# Patient Record
Sex: FEMALE | Race: WHITE | NOT HISPANIC OR LATINO | Employment: FULL TIME | URBAN - METROPOLITAN AREA
[De-identification: names, ages, dates, MRNs, and addresses within clinical notes are randomized per-mention and may not be internally consistent; named-entity substitution may affect disease eponyms.]

---

## 2019-08-06 ENCOUNTER — OFFICE VISIT - HEALTHEAST (OUTPATIENT)
Dept: FAMILY MEDICINE | Facility: CLINIC | Age: 34
End: 2019-08-06

## 2019-08-06 DIAGNOSIS — F33.2 SEVERE EPISODE OF RECURRENT MAJOR DEPRESSIVE DISORDER, WITHOUT PSYCHOTIC FEATURES (H): ICD-10-CM

## 2019-08-06 DIAGNOSIS — N30.01 ACUTE CYSTITIS WITH HEMATURIA: ICD-10-CM

## 2019-08-06 DIAGNOSIS — F41.9 ANXIETY: ICD-10-CM

## 2019-08-06 ASSESSMENT — MIFFLIN-ST. JEOR: SCORE: 1520.96

## 2021-05-31 NOTE — PROGRESS NOTES
Assessment/Plan:      Patient was incorrectly assigned a 20-minute emergency department follow-up/establishment of care appointment type and then arrived late.  Given the limitations of time, we chose to the following:    Mood:  -Patient has been on 2 separate SSRIs, one was insufficient and one was too expensive to determine efficacy; patient has been on Wellbutrin in the past but found that it worsened her anxiety  -However, given patient's extreme symptoms and need for treatment urgently, we felt that a trial of Wellbutrin may be reasonable.  If patient does not tolerate it, would initiate patient on Prozac.  If patient does tolerate it but would like improved efficacy, could combine Wellbutrin and Prozac.  -Discussed that I typically do not prescribe benzodiazepines but that if patient needed a bridge prior to seeing psychiatry, I would be happy to fill this.  -Offered hydroxyzine but patient has taken it in the past and patient does not feel that she would use it  -Referral placed to psychiatry in case treatment regimen is ineffective as a precaution    She had evidence of a urinary tract infection in the emergency department last night and has been expensing symptoms for several weeks:  -Await culture  -3 days Macrobid  -Low threshold to obtain a wet prep at next appointment      Problem List Items Addressed This Visit     None      Visit Diagnoses     Acute cystitis with hematuria    -  Primary    Relevant Medications    nitrofurantoin, macrocrystal-monohydrate, (MACROBID) 100 MG capsule    Anxiety        Relevant Medications    buPROPion (WELLBUTRIN XL) 150 MG 24 hr tablet    Other Relevant Orders    Ambulatory referral to Psychiatry    Severe episode of recurrent major depressive disorder, without psychotic features (H)        Relevant Medications    buPROPion (WELLBUTRIN XL) 150 MG 24 hr tablet    Other Relevant Orders    Ambulatory referral to Psychiatry          Return to clinic in 1-2 weeks.    Total  "time spent with patient was 25 minutes with greater than 50% spent in face-to-face counseling regarding the above plan.    Subjective:      Radhika Negrete is a 33 y.o. female who presents to clinic for emergency department follow-up.  Patient was seen yesterday in the emergency department for depression.  It worsened when she discontinued her antidepressant, Zoloft.  Apparently her PCP changed her to Trintellix, another SSRI, but patient had not ever initiated it secondary to cost.  In addition, patient had been taking Adderall without a prescription and recently quit.  This information is from the emergency department provider note.    Patient discusses her gambling addiction.  She has a history of depression and has been on Abilify but has no diagnosis of bipolar disorder.  She denies thoughts of grandiosity, risky behavior, sexual promiscuity, drug use, or shopping sprees.  She has tried Zoloft and Trintellix in the past.  She felt the Zoloft was ineffective.   Patient has also tried hydroxyzine and has been on Xanax in the past.  She is currently not taking any of these medications.    She specifically declines any thoughts of hurting herself but does state \"what is the point\".  When asked specifically about passive suicidal ideation patient did endorse this feature.  She has no plan.  She feels safe.      Past Medical History, Family History, and Social History reviewed.    Current Outpatient Medications on File Prior to Visit   Medication Sig Dispense Refill     ALPRAZolam (XANAX) 0.5 MG tablet Take 0.5 mg by mouth bedtime as needed for sleep.       citalopram (CELEXA) 40 MG tablet Take 40 mg by mouth daily.       diclofenac (VOLTAREN) 75 MG EC tablet Take 75 mg by mouth daily as needed.       zolpidem (AMBIEN) 5 MG tablet Take 5 mg by mouth bedtime as needed for sleep.       No current facility-administered medications on file prior to visit.        Review of systems is as stated in HPI.  The remainder of " "the 10 system review is otherwise negative.    Objective:     BP 94/68   Pulse 91   Ht 5' 3\" (1.6 m)   Wt 190 lb (86.2 kg)   LMP 08/05/2019   SpO2 98%   BMI 33.66 kg/m   Body mass index is 33.66 kg/m .    Gen: Alert, NAD, appears stated age, normal hygiene   Psych: no apparent hallucinations or delusions, no pressured speech; alert, oriented x3, tearful but appropriate    This note has been dictated using voice recognition software. Any grammatical or context distortions are unintentional and inherent to the the software.     Paty Oneill MD      "

## 2021-06-01 ENCOUNTER — RECORDS - HEALTHEAST (OUTPATIENT)
Dept: ADMINISTRATIVE | Facility: CLINIC | Age: 36
End: 2021-06-01

## 2021-06-03 VITALS — BODY MASS INDEX: 33.66 KG/M2 | WEIGHT: 190 LBS | HEIGHT: 63 IN

## 2025-01-14 ENCOUNTER — OFFICE VISIT (OUTPATIENT)
Dept: FAMILY MEDICINE | Facility: CLINIC | Age: 40
End: 2025-01-14
Payer: COMMERCIAL

## 2025-01-14 VITALS
BODY MASS INDEX: 41.3 KG/M2 | HEART RATE: 73 BPM | SYSTOLIC BLOOD PRESSURE: 138 MMHG | RESPIRATION RATE: 14 BRPM | DIASTOLIC BLOOD PRESSURE: 82 MMHG | TEMPERATURE: 97.7 F | OXYGEN SATURATION: 96 % | HEIGHT: 64 IN | WEIGHT: 241.9 LBS

## 2025-01-14 DIAGNOSIS — Z98.84 BARIATRIC SURGERY STATUS: ICD-10-CM

## 2025-01-14 DIAGNOSIS — Z13.220 LIPID SCREENING: ICD-10-CM

## 2025-01-14 DIAGNOSIS — Z12.31 VISIT FOR SCREENING MAMMOGRAM: ICD-10-CM

## 2025-01-14 DIAGNOSIS — E66.01 MORBID OBESITY (H): ICD-10-CM

## 2025-01-14 DIAGNOSIS — E34.9 HORMONE IMBALANCE: ICD-10-CM

## 2025-01-14 DIAGNOSIS — R63.5 WEIGHT GAIN: ICD-10-CM

## 2025-01-14 DIAGNOSIS — R53.83 FATIGUE, UNSPECIFIED TYPE: Primary | ICD-10-CM

## 2025-01-14 PROBLEM — F41.1 ANXIETY STATE: Status: ACTIVE | Noted: 2025-01-14

## 2025-01-14 LAB
ALBUMIN SERPL BCG-MCNC: 4.3 G/DL (ref 3.5–5.2)
ALP SERPL-CCNC: 70 U/L (ref 40–150)
ALT SERPL W P-5'-P-CCNC: 21 U/L (ref 0–50)
ANION GAP SERPL CALCULATED.3IONS-SCNC: 12 MMOL/L (ref 7–15)
AST SERPL W P-5'-P-CCNC: 20 U/L (ref 0–45)
BILIRUB SERPL-MCNC: 0.3 MG/DL
BUN SERPL-MCNC: 14.6 MG/DL (ref 6–20)
CALCIUM SERPL-MCNC: 9.4 MG/DL (ref 8.8–10.4)
CHLORIDE SERPL-SCNC: 105 MMOL/L (ref 98–107)
CHOLEST SERPL-MCNC: 198 MG/DL
CREAT SERPL-MCNC: 0.88 MG/DL (ref 0.51–0.95)
EGFRCR SERPLBLD CKD-EPI 2021: 85 ML/MIN/1.73M2
ERYTHROCYTE [DISTWIDTH] IN BLOOD BY AUTOMATED COUNT: 12.9 % (ref 10–15)
FASTING STATUS PATIENT QL REPORTED: YES
FASTING STATUS PATIENT QL REPORTED: YES
FERRITIN SERPL-MCNC: 16 NG/ML (ref 6–175)
FSH SERPL IRP2-ACNC: 4.6 MIU/ML
GLUCOSE SERPL-MCNC: 90 MG/DL (ref 70–99)
HCO3 SERPL-SCNC: 23 MMOL/L (ref 22–29)
HCT VFR BLD AUTO: 39.7 % (ref 35–47)
HDLC SERPL-MCNC: 59 MG/DL
HGB BLD-MCNC: 13.3 G/DL (ref 11.7–15.7)
LDLC SERPL CALC-MCNC: 124 MG/DL
MCH RBC QN AUTO: 30.1 PG (ref 26.5–33)
MCHC RBC AUTO-ENTMCNC: 33.5 G/DL (ref 31.5–36.5)
MCV RBC AUTO: 90 FL (ref 78–100)
NONHDLC SERPL-MCNC: 139 MG/DL
PLATELET # BLD AUTO: 288 10E3/UL (ref 150–450)
POTASSIUM SERPL-SCNC: 3.9 MMOL/L (ref 3.4–5.3)
PROT SERPL-MCNC: 6.8 G/DL (ref 6.4–8.3)
RBC # BLD AUTO: 4.42 10E6/UL (ref 3.8–5.2)
SODIUM SERPL-SCNC: 140 MMOL/L (ref 135–145)
TRIGL SERPL-MCNC: 76 MG/DL
TSH SERPL DL<=0.005 MIU/L-ACNC: 1.86 UIU/ML (ref 0.3–4.2)
VIT B12 SERPL-MCNC: 567 PG/ML (ref 232–1245)
VIT D+METAB SERPL-MCNC: 21 NG/ML (ref 20–50)
WBC # BLD AUTO: 6.8 10E3/UL (ref 4–11)

## 2025-01-14 PROCEDURE — 85027 COMPLETE CBC AUTOMATED: CPT | Performed by: NURSE PRACTITIONER

## 2025-01-14 PROCEDURE — 82607 VITAMIN B-12: CPT | Performed by: NURSE PRACTITIONER

## 2025-01-14 PROCEDURE — 99000 SPECIMEN HANDLING OFFICE-LAB: CPT | Performed by: NURSE PRACTITIONER

## 2025-01-14 PROCEDURE — 82728 ASSAY OF FERRITIN: CPT | Performed by: NURSE PRACTITIONER

## 2025-01-14 PROCEDURE — 80053 COMPREHEN METABOLIC PANEL: CPT | Performed by: NURSE PRACTITIONER

## 2025-01-14 PROCEDURE — 84425 ASSAY OF VITAMIN B-1: CPT | Mod: 90 | Performed by: NURSE PRACTITIONER

## 2025-01-14 PROCEDURE — 83001 ASSAY OF GONADOTROPIN (FSH): CPT | Performed by: NURSE PRACTITIONER

## 2025-01-14 PROCEDURE — 80061 LIPID PANEL: CPT | Performed by: NURSE PRACTITIONER

## 2025-01-14 PROCEDURE — 36415 COLL VENOUS BLD VENIPUNCTURE: CPT | Performed by: NURSE PRACTITIONER

## 2025-01-14 PROCEDURE — 84443 ASSAY THYROID STIM HORMONE: CPT | Performed by: NURSE PRACTITIONER

## 2025-01-14 PROCEDURE — 82306 VITAMIN D 25 HYDROXY: CPT | Performed by: NURSE PRACTITIONER

## 2025-01-14 PROCEDURE — 99204 OFFICE O/P NEW MOD 45 MIN: CPT | Performed by: NURSE PRACTITIONER

## 2025-01-14 RX ORDER — CYANOCOBALAMIN 1000 UG/ML
1000 INJECTION, SOLUTION INTRAMUSCULAR; SUBCUTANEOUS
Qty: 3 ML | Refills: 3 | Status: SHIPPED | OUTPATIENT
Start: 2025-01-14

## 2025-01-14 ASSESSMENT — PAIN SCALES - GENERAL: PAINLEVEL_OUTOF10: NO PAIN (0)

## 2025-01-14 NOTE — PROGRESS NOTES
"  Assessment & Plan     Fatigue, unspecified type  - CBC with platelets  - CBC with platelets    Morbid obesity (H)  Instructed on use of medication, potential side effects and adverse reactions.    - Comprehensive metabolic panel (BMP + Alb, Alk Phos, ALT, AST, Total. Bili, TP)  - tirzepatide-Weight Management (ZEPBOUND) 2.5 MG/0.5ML prefilled pen  Dispense: 2 mL; Refill: 0  - tirzepatide-Weight Management (ZEPBOUND) 5 MG/0.5ML prefilled pen  Dispense: 2 mL; Refill: 0  - Comprehensive metabolic panel (BMP + Alb, Alk Phos, ALT, AST, Total. Bili, TP)    Hormone imbalance  - Follicle stimulating hormone  - Comprehensive metabolic panel (BMP + Alb, Alk Phos, ALT, AST, Total. Bili, TP)  - Follicle stimulating hormone  - Comprehensive metabolic panel (BMP + Alb, Alk Phos, ALT, AST, Total. Bili, TP)    Weight gain  - TSH with free T4 reflex  - Comprehensive metabolic panel (BMP + Alb, Alk Phos, ALT, AST, Total. Bili, TP)  - tirzepatide-Weight Management (ZEPBOUND) 2.5 MG/0.5ML prefilled pen  Dispense: 2 mL; Refill: 0  - tirzepatide-Weight Management (ZEPBOUND) 5 MG/0.5ML prefilled pen  Dispense: 2 mL; Refill: 0  - TSH with free T4 reflex  - Comprehensive metabolic panel (BMP + Alb, Alk Phos, ALT, AST, Total. Bili, TP)    Bariatric surgery status  - Vitamin B12  - Vitamin D Deficiency  - cyanocobalamin (CYANOCOBALAMIN) 1000 mcg/mL injection  Dispense: 3 mL; Refill: 3  - syringe/needle, disp, (SAFETY SYRINGE/NEEDLE) 23G X 1\" 3 ML MISC  Dispense: 50 each; Refill: 1  - Ferritin  - Vitamin B1 whole blood  - Vitamin B12  - Vitamin D Deficiency  - Ferritin  - Vitamin B1 whole blood    Lipid screening  - Lipid panel reflex to direct LDL Fasting  - Lipid panel reflex to direct LDL Fasting    Visit for screening mammogram  - MA Screen Bilateral w/Sonido            BMI  Estimated body mass index is 42.18 kg/m  as calculated from the following:    Height as of this encounter: 1.613 m (5' 3.5\").    Weight as of this encounter: 109.7 kg " "(241 lb 14.4 oz).   Weight management plan: Discussed healthy diet and exercise guidelines      Elio Garrido is a 39 year old, presenting for the following health issues:  Establish Care (Hormones, fatigue, weight gain, facial hair that she has not had before, gastric sleeve patient, has issues with craving foods)        1/14/2025    11:14 AM   Additional Questions   Roomed by MIGUEL SMITH     History of Present Illness       Reason for visit:  Find a primary pcp, weight loss help, horomone regultion  Symptom onset:  More than a month  Symptoms include:  Fatigue, facial hair growth, weight gain  Symptom intensity:  Moderate  Symptom progression:  Worsening  Had these symptoms before:  No  Prior treatment description:  Gastric Sleve    She eats 2-3 servings of fruits and vegetables daily.She consumes 0 sweetened beverage(s) daily.She exercises with enough effort to increase her heart rate 9 or less minutes per day.  She exercises with enough effort to increase her heart rate 3 or less days per week.   She is taking medications regularly.     For a while now has been feeling like her hormones are off- fatigue, weight gain, excessive facial hair growth, hair thinning, menses heavier.  Symptoms gradually worsening.  Menses still monthly but flow has changed.  Emotionally labile week before menses.     Review of Systems  Constitutional, HEENT, cardiovascular, pulmonary, GI, , musculoskeletal, neuro, skin, endocrine and psych systems are negative, except as otherwise noted.      Objective    /82 (BP Location: Left arm, Patient Position: Sitting, Cuff Size: Adult Regular)   Pulse 73   Temp 97.7  F (36.5  C) (Oral)   Resp 14   Ht 1.613 m (5' 3.5\")   Wt 109.7 kg (241 lb 14.4 oz)   LMP 12/20/2024 (Approximate)   SpO2 96%   Breastfeeding No   BMI 42.18 kg/m    Body mass index is 42.18 kg/m .  Physical Exam   GENERAL: alert and no distress  NECK: no adenopathy, no asymmetry, masses, or scars  RESP: lungs " clear to auscultation - no rales, rhonchi or wheezes  CV: regular rate and rhythm, normal S1 S2, no S3 or S4, no murmur, click or rub, no peripheral edema   MS: no gross musculoskeletal defects noted, no edema  SKIN: no suspicious lesions or rashes  NEURO: Normal strength and tone, mentation intact and speech normal  PSYCH: mentation appears normal, affect normal/bright    Results  reviewed in Epic from [facility]          Signed Electronically by: Sheire Thomas CNP

## 2025-01-14 NOTE — LETTER
January 15, 2025      Radhika Negrete  [address]  [place] [address]        Dear ,    We are writing to inform you of your test results.    Vitamin D on the low end of the normal range.  Recommend taking OTC vitamin D3- take 5,000 international unit(s)/day then recheck level in 2-3 months.  FSH indicates she is not menopausal.   Ferritin is on the low end of the normal range- recommend taking daily multivitamin with iron.  Recheck in a few months.  LDL is a little high- should come down with diet and exercise.     Resulted Orders   Follicle stimulating hormone   Result Value Ref Range    FSH 4.6 mIU/mL      Comment:      19 years and older:   Follicular phase: 3.5-12.5 mIU/mL   Ovulation phase: 4.7-21.5 mIU/mL   Luteal phase: 1.7-7.7 mIU/mL   Postmenopause: 25.8-134.8 mIU/mL      TSH with free T4 reflex   Result Value Ref Range    TSH 1.86 0.30 - 4.20 uIU/mL   Comprehensive metabolic panel (BMP + Alb, Alk Phos, ALT, AST, Total. Bili, TP)   Result Value Ref Range    Sodium 140 135 - 145 mmol/L    Potassium 3.9 3.4 - 5.3 mmol/L    Carbon Dioxide (CO2) 23 22 - 29 mmol/L    Anion Gap 12 7 - 15 mmol/L    Urea Nitrogen 14.6 6.0 - 20.0 mg/dL    Creatinine 0.88 0.51 - 0.95 mg/dL    GFR Estimate 85 >60 mL/min/1.73m2      Comment:      eGFR calculated using 2021 CKD-EPI equation.    Calcium 9.4 8.8 - 10.4 mg/dL      Comment:      Reference intervals for this test were updated on 7/16/2024 to reflect our healthy population more accurately. There may be differences in the flagging of prior results with similar values performed with this method. Those prior results can be interpreted in the context of the updated reference intervals.    Chloride 105 98 - 107 mmol/L    Glucose 90 70 - 99 mg/dL    Alkaline Phosphatase 70 40 - 150 U/L    AST 20 0 - 45 U/L    ALT 21 0 - 50 U/L    Protein Total 6.8 6.4 - 8.3 g/dL    Albumin 4.3 3.5 - 5.2 g/dL    Bilirubin Total 0.3 <=1.2 mg/dL    Patient Fasting > 8hrs? Yes     CBC with platelets   Result Value Ref Range    WBC Count 6.8 4.0 - 11.0 10e3/uL    RBC Count 4.42 3.80 - 5.20 10e6/uL    Hemoglobin 13.3 11.7 - 15.7 g/dL    Hematocrit 39.7 35.0 - 47.0 %    MCV 90 78 - 100 fL    MCH 30.1 26.5 - 33.0 pg    MCHC 33.5 31.5 - 36.5 g/dL    RDW 12.9 10.0 - 15.0 %    Platelet Count 288 150 - 450 10e3/uL   Lipid panel reflex to direct LDL Fasting   Result Value Ref Range    Cholesterol 198 <200 mg/dL    Triglycerides 76 <150 mg/dL    Direct Measure HDL 59 >=50 mg/dL    LDL Cholesterol Calculated 124 (H) <100 mg/dL    Non HDL Cholesterol 139 (H) <130 mg/dL    Patient Fasting > 8hrs? Yes     Narrative    Cholesterol  Desirable: < 200 mg/dL  Borderline High: 200 - 239 mg/dL  High: >= 240 mg/dL    Triglycerides  Normal: < 150 mg/dL  Borderline High: 150 - 199 mg/dL  High: 200-499 mg/dL  Very High: >= 500 mg/dL    Direct Measure HDL  Female: >= 50 mg/dL   Male: >= 40 mg/dL    LDL Cholesterol  Desirable: < 100 mg/dL  Above Desirable: 100 - 129 mg/dL   Borderline High: 130 - 159 mg/dL   High:  160 - 189 mg/dL   Very High: >= 190 mg/dL    Non HDL Cholesterol  Desirable: < 130 mg/dL  Above Desirable: 130 - 159 mg/dL  Borderline High: 160 - 189 mg/dL  High: 190 - 219 mg/dL  Very High: >= 220 mg/dL   Vitamin B12   Result Value Ref Range    Vitamin B12 567 232 - 1,245 pg/mL   Vitamin D Deficiency   Result Value Ref Range    Vitamin D, Total (25-Hydroxy) 21 20 - 50 ng/mL      Comment:      optimum levels    Narrative    Season, race, dietary intake, and treatment affect the concentration of 25-hydroxy-Vitamin D. Values may decrease during winter months and increase during summer months.    Vitamin D determination is routinely performed by an immunoassay specific for 25 hydroxyvitamin D3.  If an individual is on vitamin D2(ergocalciferol) supplementation, please specify 25 OH vitamin D2 and D3 level determination by LCMSMS test VITD23.     Ferritin   Result Value Ref Range    Ferritin 16 6 - 175 ng/mL        If you have any questions or concerns, please call the clinic at the number listed above.       Sincerely,      Sherie Thomas CNP    Electronically signed

## 2025-01-15 ENCOUNTER — PATIENT OUTREACH (OUTPATIENT)
Dept: CARE COORDINATION | Facility: CLINIC | Age: 40
End: 2025-01-15

## 2025-01-15 ENCOUNTER — TELEPHONE (OUTPATIENT)
Dept: FAMILY MEDICINE | Facility: CLINIC | Age: 40
End: 2025-01-15

## 2025-01-15 DIAGNOSIS — E55.9 VITAMIN D DEFICIENCY: ICD-10-CM

## 2025-01-15 DIAGNOSIS — R79.0 LOW FERRITIN: Primary | ICD-10-CM

## 2025-01-15 NOTE — TELEPHONE ENCOUNTER
Call placed to patient and left message to return our call regarding lab results. Results also mailed to patient per Provider.   Lyla Jimenez LPN on 1/15/2025 at 8:56 AM

## 2025-01-15 NOTE — TELEPHONE ENCOUNTER
Patient returned call and relayed message.  She will call to make an appointment for lab recheck.  Request for orders to be placed in chart.  Patient will call back to schedule appointment at a later time.     VIDYA Buckley RN

## 2025-01-15 NOTE — TELEPHONE ENCOUNTER
----- Message from Sherie Thomas sent at 1/15/2025  7:56 AM CST -----  Please call patient- vitamin D on the low end of the normal range.  Recommend taking OTC vitamin D3- take 5,000 international unit(s)/day then recheck level in 2-3 months.  FSH indicates she is not menopausal.   Ferritin is on the low end of the normal range- recommend taking daily multivitamin with iron.  Recheck in a few months.  LDL is a little high- should come down with diet and exercise.  Also please send results letter.

## 2025-01-16 NOTE — RESULT ENCOUNTER NOTE
Called and adv pt of lab results and she adv she had already been called  And also would like to know if you pharmacy has contacted us about a possible PA for Wegovy

## 2025-01-17 ENCOUNTER — TELEPHONE (OUTPATIENT)
Dept: FAMILY MEDICINE | Facility: CLINIC | Age: 40
End: 2025-01-17

## 2025-01-17 NOTE — TELEPHONE ENCOUNTER
Prior Authorization Retail Medication Request    Medication/Dose: zepbound   Diagnosis and ICD code (if different than what is on RX):  see chart  New/renewal/insurance change PA/secondary ins. PA:  Previously Tried and Failed:    Rationale:      Insurance medical assistance   Primary:   Insurance ID:  [id number]     Secondary (if applicable): [facility]   Insurance ID: [id number]    Pharmacy Information (if different than what is on RX)  Name:  [facility]  Phone:    Fax:see chart    Clinic Information  Preferred routing pool for dept communication: [facility]

## 2025-01-18 LAB — VIT B1 PYROPHOSHATE BLD-SCNC: 102 NMOL/L

## 2025-01-21 ENCOUNTER — TELEPHONE (OUTPATIENT)
Dept: FAMILY MEDICINE | Facility: CLINIC | Age: 40
End: 2025-01-21

## 2025-01-21 ENCOUNTER — MYC MEDICAL ADVICE (OUTPATIENT)
Dept: FAMILY MEDICINE | Facility: CLINIC | Age: 40
End: 2025-01-21

## 2025-01-21 ENCOUNTER — NURSE TRIAGE (OUTPATIENT)
Dept: FAMILY MEDICINE | Facility: CLINIC | Age: 40
End: 2025-01-21

## 2025-01-21 NOTE — TELEPHONE ENCOUNTER
PA Initiation    Medication: ZEPBOUND 2.5 MG/0.5ML SC SOAJ  Insurance Company: Other (see comments)  Pharmacy Filling the Rx: [facility], [place] [address] [place] [address]  Filling Pharmacy Phone: [phone]  Filling Pharmacy Fax: [phone]  Start Date: 1/21/2025

## 2025-01-21 NOTE — TELEPHONE ENCOUNTER
Spoke to patient and relayed provider message. Patient verbalized understanding and is in agreement with plan.     Patient scheduled for office visit with PCP on 1/22/25.     Dorcas Alvares RN  [facility]

## 2025-01-21 NOTE — TELEPHONE ENCOUNTER
General Call    Contacts       Contact Date/Time Type Contact Phone/Fax    01/21/2025 10:53 AM CST Phone (Incoming) Radhika Negrete (Self) [phone] (M)          Reason for Call:  Patient is looking for an update on her prior authorization and would like a return call     What are your questions or concerns:  Insurance is still waiting on paperwork     Date of last appointment with provider:     Okay to leave a detailed message?: Yes at Cell number on file:    Telephone Information:   Mobile [phone]

## 2025-01-21 NOTE — TELEPHONE ENCOUNTER
Provider Response to 2nd Level Triage Request    I have reviewed the RN documentation. My recommendation is:  Face To Face Visit. Within 1 week

## 2025-01-22 ENCOUNTER — OFFICE VISIT (OUTPATIENT)
Dept: FAMILY MEDICINE | Facility: CLINIC | Age: 40
End: 2025-01-22
Payer: COMMERCIAL

## 2025-01-22 VITALS
OXYGEN SATURATION: 98 % | HEIGHT: 64 IN | BODY MASS INDEX: 41.48 KG/M2 | TEMPERATURE: 97.6 F | WEIGHT: 243 LBS | DIASTOLIC BLOOD PRESSURE: 74 MMHG | SYSTOLIC BLOOD PRESSURE: 138 MMHG | RESPIRATION RATE: 16 BRPM | HEART RATE: 83 BPM

## 2025-01-22 DIAGNOSIS — E66.01 MORBID OBESITY (H): ICD-10-CM

## 2025-01-22 DIAGNOSIS — L98.9 SKIN LESION: ICD-10-CM

## 2025-01-22 DIAGNOSIS — L30.9 ECZEMA, UNSPECIFIED TYPE: ICD-10-CM

## 2025-01-22 DIAGNOSIS — H01.135 ECZEMATOUS DERMATITIS OF UPPER AND LOWER EYELIDS OF BOTH EYES: Primary | ICD-10-CM

## 2025-01-22 DIAGNOSIS — H01.132 ECZEMATOUS DERMATITIS OF UPPER AND LOWER EYELIDS OF BOTH EYES: Primary | ICD-10-CM

## 2025-01-22 DIAGNOSIS — H01.131 ECZEMATOUS DERMATITIS OF UPPER AND LOWER EYELIDS OF BOTH EYES: Primary | ICD-10-CM

## 2025-01-22 DIAGNOSIS — H01.134 ECZEMATOUS DERMATITIS OF UPPER AND LOWER EYELIDS OF BOTH EYES: Primary | ICD-10-CM

## 2025-01-22 PROCEDURE — 99213 OFFICE O/P EST LOW 20 MIN: CPT | Performed by: NURSE PRACTITIONER

## 2025-01-22 RX ORDER — TRIAMCINOLONE ACETONIDE 1 MG/G
CREAM TOPICAL 2 TIMES DAILY
Qty: 30 G | Refills: 1 | Status: SHIPPED | OUTPATIENT
Start: 2025-01-22

## 2025-01-22 RX ORDER — LOTEPREDNOL ETABONATE 5 MG/G
1 OINTMENT OPHTHALMIC 3 TIMES DAILY
Qty: 7 G | Refills: 1 | Status: SHIPPED | OUTPATIENT
Start: 2025-01-22 | End: 2025-02-12

## 2025-01-22 RX ORDER — OXYCODONE HYDROCHLORIDE 5 MG/1
TABLET ORAL
COMMUNITY
Start: 2024-10-29 | End: 2025-01-22

## 2025-01-22 RX ORDER — AMOXICILLIN 500 MG/1
CAPSULE ORAL
COMMUNITY
Start: 2024-10-22 | End: 2025-01-22

## 2025-01-22 RX ORDER — PHENTERMINE HYDROCHLORIDE 37.5 MG/1
37.5 TABLET ORAL
Qty: 30 TABLET | Refills: 1 | Status: SHIPPED | OUTPATIENT
Start: 2025-01-22

## 2025-01-22 ASSESSMENT — PAIN SCALES - GENERAL: PAINLEVEL_OUTOF10: NO PAIN (0)

## 2025-01-22 NOTE — PROGRESS NOTES
Elio Garrido is a 39 year old, presenting for the following health issues:  Derm Problem (Eyelids Flaking, puffy,red,dry like dandruff)      1/22/2025     2:07 PM   Additional Questions   Roomed by  LPN     History of Present Illness       Reason for visit:  Find a primary pcp, weight loss help, horomone regultion  Symptom onset:  3-4 weeks ago  Symptom intensity:  Moderate  Symptom progression:  Worsening  Had these symptoms before:  No  Has tried/received treatment for these symptoms:  No    She eats 2-3 servings of fruits and vegetables daily.She consumes 0 sweetened beverage(s) daily.She exercises with enough effort to increase her heart rate 9 or less minutes per day.  She exercises with enough effort to increase her heart rate 3 or less days per week.   She is taking medications regularly.     Eyelids flaky and irritated x 1 month.  Has fake eyelashes- no change to glue.  Unable to get Zepbound or Wegovy currently- would like Rx for phentermine instead     Review of Systems  Constitutional, HEENT, cardiovascular, pulmonary, gi and gu systems are negative, except as otherwise noted.      Objective    LMP 12/20/2024 (Approximate)   Breastfeeding No   There is no height or weight on file to calculate BMI.  Physical Exam   GENERAL: alert and no distress  EYES: eyelids inflamed  NECK: no adenopathy, no asymmetry, masses, or scars  RESP: respirations even, non-labored   SKIN: irregular patches on lower legs  PSYCH: mentation appears normal, affect normal/bright    Office Visit on 01/14/2025   Component Date Value Ref Range Status    FSH 01/14/2025 4.6  mIU/mL Final    19 years and older:   Follicular phase: 3.5-12.5 mIU/mL   Ovulation phase: 4.7-21.5 mIU/mL   Luteal phase: 1.7-7.7 mIU/mL   Postmenopause: 25.8-134.8 mIU/mL       TSH 01/14/2025 1.86  0.30 - 4.20 uIU/mL Final    Sodium 01/14/2025 140  135 - 145 mmol/L Final    Potassium 01/14/2025 3.9  3.4 - 5.3 mmol/L Final    Carbon Dioxide (CO2)  01/14/2025 23  22 - 29 mmol/L Final    Anion Gap 01/14/2025 12  7 - 15 mmol/L Final    Urea Nitrogen 01/14/2025 14.6  6.0 - 20.0 mg/dL Final    Creatinine 01/14/2025 0.88  0.51 - 0.95 mg/dL Final    GFR Estimate 01/14/2025 85  >60 mL/min/1.73m2 Final    eGFR calculated using 2021 CKD-EPI equation.    Calcium 01/14/2025 9.4  8.8 - 10.4 mg/dL Final    Reference intervals for this test were updated on 7/16/2024 to reflect our healthy population more accurately. There may be differences in the flagging of prior results with similar values performed with this method. Those prior results can be interpreted in the context of the updated reference intervals.    Chloride 01/14/2025 105  98 - 107 mmol/L Final    Glucose 01/14/2025 90  70 - 99 mg/dL Final    Alkaline Phosphatase 01/14/2025 70  40 - 150 U/L Final    AST 01/14/2025 20  0 - 45 U/L Final    ALT 01/14/2025 21  0 - 50 U/L Final    Protein Total 01/14/2025 6.8  6.4 - 8.3 g/dL Final    Albumin 01/14/2025 4.3  3.5 - 5.2 g/dL Final    Bilirubin Total 01/14/2025 0.3  <=1.2 mg/dL Final    Patient Fasting > 8hrs? 01/14/2025 Yes   Final    WBC Count 01/14/2025 6.8  4.0 - 11.0 10e3/uL Final    RBC Count 01/14/2025 4.42  3.80 - 5.20 10e6/uL Final    Hemoglobin 01/14/2025 13.3  11.7 - 15.7 g/dL Final    Hematocrit 01/14/2025 39.7  35.0 - 47.0 % Final    MCV 01/14/2025 90  78 - 100 fL Final    MCH 01/14/2025 30.1  26.5 - 33.0 pg Final    MCHC 01/14/2025 33.5  31.5 - 36.5 g/dL Final    RDW 01/14/2025 12.9  10.0 - 15.0 % Final    Platelet Count 01/14/2025 288  150 - 450 10e3/uL Final    Cholesterol 01/14/2025 198  <200 mg/dL Final    Triglycerides 01/14/2025 76  <150 mg/dL Final    Direct Measure HDL 01/14/2025 59  >=50 mg/dL Final    LDL Cholesterol Calculated 01/14/2025 124 (H)  <100 mg/dL Final    Non HDL Cholesterol 01/14/2025 139 (H)  <130 mg/dL Final    Patient Fasting > 8hrs? 01/14/2025 Yes   Final    Vitamin B12 01/14/2025 567  232 - 1,245 pg/mL Final    Vitamin D, Total  (25-Hydroxy) 01/14/2025 21  20 - 50 ng/mL Final    optimum levels    Ferritin 01/14/2025 16  6 - 175 ng/mL Final    Vitamin B1 Whole Blood Level 01/14/2025 102  70 - 180 nmol/L Final    INTERPRETIVE INFORMATION: Vitamin B1, Whole Blood    This assay measures the concentration of thiamine   diphosphate (TDP), the primary active form of vitamin B1.   Approximately 90 percent of vitamin B1 present in whole   blood is TDP. Thiamine and thiamine monophosphate, which   comprise the remaining 10 percent, are not measured.    This test was developed and its performance characteristics   determined by [facility]. It has not been cleared or   approved by the US Food and Drug Administration. This test   was performed in a CLIA certified laboratory and is   intended for clinical purposes.  Performed By: [facility]  [address] [place] [id number]  : Nicolas Thomas MD, PhD  CLIA Number: [id number]       A/P:  1. Eczematous dermatitis of upper and lower eyelids of both eyes (Primary)  - Loteprednol Etabonate (LOTEMAX) 0.5 % OINT opthalmic ointment; Place 1 Application (4 g) into both eyes 3 times daily for 21 days.  Dispense: 7 g; Refill: 1  Advised to remove artifical lashes    2. Skin lesion  - Adult Dermatology  Referral; Future    3. Eczema, unspecified type  - Adult Dermatology  Referral; Future  - triamcinolone (KENALOG) 0.1 % external cream; Apply topically 2 times daily.  Dispense: 30 g; Refill: 1    4. Morbid obesity (H)  - phentermine (ADIPEX-P) 37.5 MG tablet; Take 1 tablet (37.5 mg) by mouth every morning (before breakfast).  Dispense: 30 tablet; Refill: 1      Signed Electronically by: Sherie Thomas CNP     Opioid Pregnancy And Lactation Text: These medications can lead to premature delivery and should be avoided during pregnancy. These medications are also present in breast milk in small amounts.

## 2025-01-23 ENCOUNTER — TELEPHONE (OUTPATIENT)
Dept: FAMILY MEDICINE | Facility: CLINIC | Age: 40
End: 2025-01-23

## 2025-01-23 NOTE — TELEPHONE ENCOUNTER
Prior Authorization Retail Medication Request     Medication/Dose: zepbound   Diagnosis and ICD code (if different than what is on RX):  see chart  New/renewal/insurance change PA/secondary ins. PA:  Previously Tried and Failed:    Rationale:       Insurance medical assistance   Primary:   Insurance ID:  [id number]     Pharmacy Information (if different than what is on RX)  [facility]  Phone:    Fax:see chart     Clinic Information  Preferred routing pool for dept communication: [facility]

## 2025-01-23 NOTE — TELEPHONE ENCOUNTER
PRIOR AUTHORIZATION DENIED    Medication: ZEPBOUND 2.5 MG/0.5ML SC SOAJ  Insurance Company: Other (see comments)  Denial Date: 1/21/2025  Denial Reason(s):       Appeal Information:       Patient Notified: NO

## 2025-01-28 NOTE — TELEPHONE ENCOUNTER
PA Initiation    Medication: ZEPBOUND 2.5 MG/0.5ML SC SOAJ INSURANCE 2 OF 2  Insurance Company: [employer] for [place] Medicaid Phone [phone] Fax [phone]  Pharmacy Filling the Rx: [address] [place] [address] [place] [address]  Filling Pharmacy Phone: [phone]  Filling Pharmacy Fax: [phone]  Start Date: 1/27/2025

## 2025-01-28 NOTE — TELEPHONE ENCOUNTER
Prior Authorization Approval    Medication: ZEPBOUND 2.5 MG/0.5ML SC SOAJ INSURANCE 2 OF 2   Authorization Effective Date: 1/27/2025  Authorization Expiration Date: 7/26/2025  Approved Dose/Quantity:   Reference #:     Insurance Company: [employer] for [place] Medicaid Phone [phone] Fax [phone]  Expected CoPay: $    CoPay Card Available:      Financial Assistance Needed:   Which Pharmacy is filling the prescription: [address] [place] [address]  Pharmacy Notified: YES  Patient Notified: **Instructed pharmacy to notify patient when script is ready to /ship.**

## 2025-02-09 ENCOUNTER — HEALTH MAINTENANCE LETTER (OUTPATIENT)
Age: 40
End: 2025-02-09

## 2025-03-20 DIAGNOSIS — R63.5 WEIGHT GAIN: ICD-10-CM

## 2025-03-20 DIAGNOSIS — E66.01 MORBID OBESITY (H): ICD-10-CM

## 2025-03-20 RX ORDER — TIRZEPATIDE 5 MG/.5ML
INJECTION, SOLUTION SUBCUTANEOUS
Qty: 2 ML | Refills: 1 | Status: SHIPPED | OUTPATIENT
Start: 2025-03-20

## 2025-04-30 ENCOUNTER — OFFICE VISIT (OUTPATIENT)
Dept: FAMILY MEDICINE | Facility: CLINIC | Age: 40
End: 2025-04-30
Payer: COMMERCIAL

## 2025-04-30 VITALS
HEIGHT: 64 IN | DIASTOLIC BLOOD PRESSURE: 80 MMHG | SYSTOLIC BLOOD PRESSURE: 138 MMHG | TEMPERATURE: 98.1 F | HEART RATE: 78 BPM | OXYGEN SATURATION: 98 % | BODY MASS INDEX: 36.33 KG/M2 | WEIGHT: 212.8 LBS | RESPIRATION RATE: 16 BRPM

## 2025-04-30 DIAGNOSIS — R35.0 URINARY FREQUENCY: ICD-10-CM

## 2025-04-30 DIAGNOSIS — R82.90 MALODOROUS URINE: ICD-10-CM

## 2025-04-30 DIAGNOSIS — E66.01 MORBID OBESITY (H): Primary | ICD-10-CM

## 2025-04-30 LAB
ALBUMIN UR-MCNC: 100 MG/DL
APPEARANCE UR: CLEAR
BACTERIA #/AREA URNS HPF: ABNORMAL /HPF
BILIRUB UR QL STRIP: ABNORMAL
COLOR UR AUTO: YELLOW
GLUCOSE UR STRIP-MCNC: NEGATIVE MG/DL
HGB UR QL STRIP: ABNORMAL
KETONES UR STRIP-MCNC: ABNORMAL MG/DL
LEUKOCYTE ESTERASE UR QL STRIP: ABNORMAL
NITRATE UR QL: NEGATIVE
PH UR STRIP: 6.5 [PH] (ref 5–8)
RBC #/AREA URNS AUTO: ABNORMAL /HPF
SP GR UR STRIP: 1.02 (ref 1–1.03)
SQUAMOUS #/AREA URNS AUTO: ABNORMAL /LPF
UROBILINOGEN UR STRIP-ACNC: 1 E.U./DL
WBC #/AREA URNS AUTO: ABNORMAL /HPF

## 2025-04-30 PROCEDURE — 81001 URINALYSIS AUTO W/SCOPE: CPT | Performed by: NURSE PRACTITIONER

## 2025-04-30 PROCEDURE — 87086 URINE CULTURE/COLONY COUNT: CPT | Performed by: NURSE PRACTITIONER

## 2025-04-30 PROCEDURE — 99213 OFFICE O/P EST LOW 20 MIN: CPT | Performed by: NURSE PRACTITIONER

## 2025-04-30 ASSESSMENT — PAIN SCALES - GENERAL: PAINLEVEL_OUTOF10: NO PAIN (0)

## 2025-04-30 NOTE — PROGRESS NOTES
"    Elio Garrido is a 39 year old, presenting for the following health issues:  Recheck Medication      4/30/2025    12:48 PM   Additional Questions   Roomed by  CRAIGN     History of Present Illness       Reason for visit:  Med check   She is taking medications regularly.        Here for follow-up weight loss.  Has done well on Zepbound but now insurance is covering Wegovy instead.  Urinary frequency, malodorous urine for about 1 month.  Afebrile.     Review of Systems  Constitutional, HEENT, cardiovascular, pulmonary, GI, , musculoskeletal, neuro, skin, endocrine and psych systems are negative, except as otherwise noted above.       Objective    /80 (BP Location: Left arm, Patient Position: Sitting, Cuff Size: Adult Regular)   Pulse 78   Temp 98.1  F (36.7  C) (Oral)   Resp 16   Ht 1.613 m (5' 3.5\")   Wt 96.5 kg (212 lb 12.8 oz)   LMP 04/13/2025 (Approximate)   SpO2 98%   Breastfeeding No   BMI 37.10 kg/m    Body mass index is 37.1 kg/m .  Physical Exam   GENERAL: alert and no distress  RESP: lungs clear to auscultation - no rales, rhonchi or wheezes  CV: regular rate and rhythm, normal S1 S2, no S3 or S4, no murmur, click or rub, no peripheral edema   ABDOMEN: soft, nontender, no hepatosplenomegaly, no masses and bowel sounds normal  MS: no gross musculoskeletal defects noted, no edema  PSYCH: mentation appears normal, affect normal/bright    Results for orders placed or performed in visit on 04/30/25 (from the past 24 hours)   UA Macroscopic with reflex to Microscopic and Culture - Lab Collect    Specimen: Urine, Midstream   Result Value Ref Range    Color Urine Yellow Colorless, Straw, Light Yellow, Yellow    Appearance Urine Clear Clear    Glucose Urine Negative Negative mg/dL    Bilirubin Urine Small (A) Negative    Ketones Urine Trace (A) Negative mg/dL    Specific Gravity Urine 1.025 1.005 - 1.030    Blood Urine Trace (A) Negative    pH Urine 6.5 5.0 - 8.0    Protein Albumin Urine " 100 (A) Negative mg/dL    Urobilinogen Urine 1.0 0.2, 1.0 E.U./dL    Nitrite Urine Negative Negative    Leukocyte Esterase Urine Small (A) Negative   UA Microscopic with Reflex to Culture   Result Value Ref Range    Bacteria Urine None Seen None Seen /HPF    RBC Urine 0-2 0-2 /HPF /HPF    WBC Urine 0-5 0-5 /HPF /HPF    Squamous Epithelials Urine Few (A) None Seen /LPF    Narrative    Microscopic exam performed on unspun urine.   Urine Culture not indicated         A/P:  1. Morbid obesity (H) (Primary)  - Semaglutide-Weight Management (WEGOVY) 1 MG/0.5ML pen; Inject 1 mg subcutaneously once a week.  Dispense: 2 mL; Refill: 3    1. Morbid obesity (H) (Primary)  - Semaglutide-Weight Management (WEGOVY) 1 MG/0.5ML pen; Inject 1 mg subcutaneously once a week.  Dispense: 2 mL; Refill: 3    2. Malodorous urine  - UA Macroscopic with reflex to Microscopic and Culture - Lab Collect; Future  - UA Macroscopic with reflex to Microscopic and Culture - Lab Collect  - UA Microscopic with Reflex to Culture  - Urine Culture; Future    3. Urinary frequency  - UA Macroscopic with reflex to Microscopic and Culture - Lab Collect; Future  - UA Macroscopic with reflex to Microscopic and Culture - Lab Collect  - UA Microscopic with Reflex to Culture  - Urine Culture; Future    Signed Electronically by: Sherie Thomas CNP

## 2025-05-01 LAB — BACTERIA UR CULT: NORMAL

## 2025-05-07 ENCOUNTER — TELEPHONE (OUTPATIENT)
Dept: FAMILY MEDICINE | Facility: CLINIC | Age: 40
End: 2025-05-07

## 2025-05-07 NOTE — TELEPHONE ENCOUNTER
Incoming call from patient and she said her insurance switched and needs a prior auth sent.       Prior Authorization Retail Medication Request    Medication/Dose:  Semaglutide-Weight Management (WEGOVY) 1 MG/0.5ML pen          Insurance   Primary: Blue Plus- [facility]  Insurance ID:  [id number]      Clinic Information  Preferred routing pool for dept communication: p [facility] nurse  Christiana

## 2025-05-10 ENCOUNTER — TELEPHONE (OUTPATIENT)
Dept: FAMILY MEDICINE | Facility: CLINIC | Age: 40
End: 2025-05-10
Payer: COMMERCIAL

## 2025-05-10 NOTE — TELEPHONE ENCOUNTER
PA Initiation    Medication: SEMAGLUTIDE-WEIGHT MANAGEMENT 1 MG/0.5ML SC SOAJ  Insurance Company: Other (see comments)Comment:  [facility]  Pharmacy Filling the Rx: [facility] [place], [id number] - [address] [place] [address]  Filling Pharmacy Phone: [phone]  Filling Pharmacy Fax: [phone]  Start Date: 5/10/2025

## 2025-05-10 NOTE — TELEPHONE ENCOUNTER
PRIOR AUTHORIZATION DENIED    Medication: SEMAGLUTIDE-WEIGHT MANAGEMENT 1 MG/0.5ML SC SOAJ  Insurance Company: Other (see comments)Comment:  [employer]  Denial Date: 5/10/2025  Denial Reason(s):     Appeal Information:     Patient Notified: No, care team must notify

## 2025-05-10 NOTE — TELEPHONE ENCOUNTER
PA Initiation    Medication: SEMAGLUTIDE-WEIGHT MANAGEMENT 1 MG/0.5ML SC SOAJ  [facility] - Phone [phone] Fax [phone]  [facility] [facility] - [address] [place] [address]  Filling Pharmacy Phone: [phone]  Filling Pharmacy Fax: [phone]  Start Date: 5/10/2025    Submission to primary payer was also sent to patients medicaid plan. Spoke with plan, they advised nothing further is needed at this time.

## 2025-05-13 NOTE — TELEPHONE ENCOUNTER
Prior Authorization Approval    Medication: SEMAGLUTIDE-WEIGHT MANAGEMENT 1 MG/0.5ML SC SOAJ  Authorization Effective Date: 3/1/2025  Authorization Expiration Date: 11/13/2025  Approved Dose/Quantity: as written  Reference #:     Insurance Company: [employer] - Phone [phone] Fax [phone]  Which Pharmacy is filling the prescription: [place] [address] [place] [address] - [address] [place] [address]  Pharmacy Notified: y  Patient Notified: Instructed pharmacy to notify patient once order is ready.

## 2025-06-18 ENCOUNTER — TELEPHONE (OUTPATIENT)
Dept: FAMILY MEDICINE | Facility: CLINIC | Age: 40
End: 2025-06-18
Payer: COMMERCIAL

## 2025-06-18 NOTE — TELEPHONE ENCOUNTER
General Call    Contacts       Contact Date/Time Type Contact Phone/Fax    06/18/2025 01:48 PM CDT Phone (Incoming) Radhika Negrete (Self) [phone] (M)          Reason for Call:  Patient wanting to go up a dose for Wegovy since the current dosing she is on right now is not doing much for her. Can this be sent to pharmacy?

## 2025-06-23 ENCOUNTER — MYC REFILL (OUTPATIENT)
Dept: FAMILY MEDICINE | Facility: CLINIC | Age: 40
End: 2025-06-23
Payer: COMMERCIAL

## 2025-06-23 DIAGNOSIS — E66.01 MORBID OBESITY (H): ICD-10-CM

## 2025-06-24 NOTE — TELEPHONE ENCOUNTER
Medication Question or Refill    Contacts       Contact Date/Time Type Contact Phone/Fax    06/23/2025 03:49 PM CDT Web (Incoming) Radhika Negrete (Self)             What medication are you calling about (include dose and sig)?: 1mg     Preferred Pharmacy:   [place] [address] [place] [address] - [address] [place] [address]  [address] [place] [address]  Phone: [phone] Fax: [phone]      Controlled Substance Agreement on file:   CSA -- Patient Level:    CSA: None found at the patient level.       Who prescribed the medication?: Sherie Thomas CNP    Do you need a refill? Yes, Need adjustment of meds- not working as dosed     When did you use the medication last? Patient used med last 2 weeks prior     Patient offered an appointment? Yes, I was calling to schedule med check as requested in rx response note: She was told at [facility] ast visit with you to call and it could be adjusted if dosage is not working well for her.     Do you have any questions or concerns?  Yes: Would like dosage change if possible       Could we send this information to you in [address] or would you prefer to receive a phone call?:   No preference   Okay to leave a detailed message?: Yes at Cell number on file:    Telephone Information:   Mobile [phone]         New Medication Request    Contacts       Contact Date/Time Type Contact Phone/Fax    06/23/2025 03:49 PM CDT Web (Incoming) Radhika Negrete (Self)             What medication are you requesting?: weight loss med    Reason for medication request: Weight loss     Have you taken this medication before?: Yes: Please call to advise     Controlled Substance Agreement on file:   CSA -- Patient Level:    CSA: None found at the patient level.         Patient offered an appointment? Yes: Pt would prefer not to schedule an appt yet as she hasn't used it for very long and is not having the result she expected.    [facility] [place]  [address] [place] [address]  Phone: [phone] Fax: [phone]      Could we send this information to you in [facility] or would you prefer to receive a phone call?:   Patient would prefer a phone call   Okay to leave a detailed message?: Yes at Home number on file [phone] (home)

## 2025-06-25 NOTE — TELEPHONE ENCOUNTER
No chart notes states you would adjust  wegovy dose w/o a visit. Pt was sent [employer] message to schedule e-visit to adjust dose by RN team on 6/18/25.